# Patient Record
Sex: MALE | Race: WHITE
[De-identification: names, ages, dates, MRNs, and addresses within clinical notes are randomized per-mention and may not be internally consistent; named-entity substitution may affect disease eponyms.]

---

## 2017-04-04 ENCOUNTER — HOSPITAL ENCOUNTER (OUTPATIENT)
Dept: HOSPITAL 75 - RAD | Age: 32
End: 2017-04-04
Attending: FAMILY MEDICINE
Payer: COMMERCIAL

## 2017-04-04 DIAGNOSIS — R09.89: ICD-10-CM

## 2017-04-04 DIAGNOSIS — R10.11: Primary | ICD-10-CM

## 2017-04-04 LAB
ALBUMIN SERPL-MCNC: 4.2 G/DL (ref 3.2–4.5)
ALT SERPL-CCNC: 13 U/L (ref 0–55)
ANION GAP SERPL CALC-SCNC: 7 MMOL/L (ref 5–14)
AST SERPL-CCNC: 17 U/L (ref 5–34)
BASOPHILS # BLD AUTO: 0 10^3/UL (ref 0–0.1)
BASOPHILS NFR BLD AUTO: 0 % (ref 0–10)
BILIRUB SERPL-MCNC: 0.9 MG/DL (ref 0.1–1)
BUN SERPL-MCNC: 10 MG/DL (ref 7–18)
BUN/CREAT SERPL: 11
CALCIUM SERPL-MCNC: 8.8 MG/DL (ref 8.5–10.1)
CHLORIDE SERPL-SCNC: 102 MMOL/L (ref 98–107)
CO2 SERPL-SCNC: 27 MMOL/L (ref 21–32)
CREAT SERPL-MCNC: 0.89 MG/DL (ref 0.6–1.3)
EOSINOPHIL # BLD AUTO: 0.1 10^3/UL (ref 0–0.3)
EOSINOPHIL NFR BLD AUTO: 1 % (ref 0–10)
ERYTHROCYTE [DISTWIDTH] IN BLOOD BY AUTOMATED COUNT: 13.1 % (ref 10–14.5)
GFR SERPLBLD BASED ON 1.73 SQ M-ARVRAT: > 60 ML/MIN
GLUCOSE SERPL-MCNC: 88 MG/DL (ref 70–105)
LIPASE SERPL-CCNC: 11 U/L (ref 8–78)
LYMPHOCYTES # BLD AUTO: 1 X 10^3 (ref 1–4)
LYMPHOCYTES NFR BLD AUTO: 12 % (ref 12–44)
MCH RBC QN AUTO: 30 PG (ref 25–34)
MCHC RBC AUTO-ENTMCNC: 35 G/DL (ref 32–36)
MCV RBC AUTO: 86 FL (ref 80–99)
MONOCYTES # BLD AUTO: 0.6 X 10^3 (ref 0–1)
MONOCYTES NFR BLD AUTO: 7 % (ref 0–12)
NEUTROPHILS # BLD AUTO: 6.6 X 10^3 (ref 1.8–7.8)
NEUTROPHILS NFR BLD AUTO: 80 % (ref 42–75)
PLATELET # BLD: 271 10^3/UL (ref 130–400)
PMV BLD AUTO: 9.7 FL (ref 7.4–10.4)
POTASSIUM SERPL-SCNC: 4.2 MMOL/L (ref 3.6–5)
PROT SERPL-MCNC: 6.9 G/DL (ref 6.4–8.2)
RBC # BLD AUTO: 5.31 10^6/UL (ref 4.35–5.85)
SODIUM SERPL-SCNC: 136 MMOL/L (ref 135–145)
WBC # BLD AUTO: 8.2 10^3/UL (ref 4.3–11)

## 2017-04-04 PROCEDURE — 83690 ASSAY OF LIPASE: CPT

## 2017-04-04 PROCEDURE — 80053 COMPREHEN METABOLIC PANEL: CPT

## 2017-04-04 PROCEDURE — 74177 CT ABD & PELVIS W/CONTRAST: CPT

## 2017-04-04 PROCEDURE — 36415 COLL VENOUS BLD VENIPUNCTURE: CPT

## 2017-04-04 PROCEDURE — 85025 COMPLETE CBC W/AUTO DIFF WBC: CPT

## 2017-04-04 NOTE — DIAGNOSTIC IMAGING REPORT
PROCEDURE: CT abdomen and pelvis with contrast.



TECHNIQUE: Multiple contiguous axial images were obtained

through the abdomen and pelvis after administration of

intravenous contrast.



INDICATION: Upper abdominal pain, nausea.



COMPARISON: 07/08/2011.



FINDINGS: The lung bases are clear. The liver, gallbladder, and

bile ducts appear normal. The spleen, adrenals, and pancreas

appear normal. The unobstructed kidneys are well perfused,

nonfocal and nonacute. The appendix is surgically absent. There

is no diverticulitis. There is no evidence for bowel, biliary,

or urinary tract obstruction. There is no focal inflammatory

process. No aneurysm, adenopathy, or mass. There is no ascites,

pneumatosis, or free air. The osseous structures and lung bases

are nonacute.



IMPRESSION: No inflammatory process, obstructive features, fluid

collection, or acute-appearing abdominopelvic abnormalities.



Dictated by:



Dictated on workstation # [id number]

## 2019-06-10 ENCOUNTER — HOSPITAL ENCOUNTER (OUTPATIENT)
Dept: HOSPITAL 75 - LAB | Age: 34
LOS: 90 days | Discharge: HOME | End: 2019-09-08
Attending: OBSTETRICS & GYNECOLOGY
Payer: COMMERCIAL

## 2019-06-10 DIAGNOSIS — N46.9: Primary | ICD-10-CM

## 2019-06-10 LAB
SPECIMEN VOL SMN: 5 ML (ref 1.5–5)
SPERM MOTILE NFR SMN: 60 %

## 2019-06-10 PROCEDURE — 89320 SEMEN ANAL VOL/COUNT/MOT: CPT

## 2019-06-11 LAB — SPERM ABNORM NFR SMN: 31 %

## 2019-09-12 ENCOUNTER — HOSPITAL ENCOUNTER (OUTPATIENT)
Dept: HOSPITAL 75 - RAD | Age: 34
End: 2019-09-12
Attending: FAMILY MEDICINE
Payer: COMMERCIAL

## 2019-09-12 DIAGNOSIS — R06.00: Primary | ICD-10-CM

## 2019-09-12 DIAGNOSIS — R09.89: ICD-10-CM

## 2019-09-12 PROCEDURE — 71045 X-RAY EXAM CHEST 1 VIEW: CPT

## 2019-09-12 NOTE — DIAGNOSTIC IMAGING REPORT
INDICATION: COARSE CRACKLES DYSPNEA



COMPARISON: Expiratory view from same day.



FINDINGS: Single frontal view of the chest demonstrates normal

heart size and pulmonary vascularity. The lungs are well aerated

and clear. No large pleural effusion or pneumothorax is seen. The

visualized osseous structures show no acute abnormalities.



IMPRESSION: 

1. No acute cardiopulmonary process.  



Dictated by: 



  Dictated on workstation # [id number]

## 2019-09-12 NOTE — DIAGNOSTIC IMAGING REPORT
INDICATION: COARSE CRACKLES DYSPNEA



COMPARISON: Inspiratory view from same day.



FINDINGS: Single frontal view of the chest demonstrates normal

heart size and pulmonary vascularity. There is crowding of the

central hilar structures consistent with expiratory technique.

The lungs are well aerated and clear. No large pleural effusion

or pneumothorax is seen. The visualized osseous structures show

no acute abnormalities.



IMPRESSION: 

1. No acute cardiopulmonary process.  



Dictated by: 



  Dictated on workstation # [id number]

## 2019-12-13 ENCOUNTER — HOSPITAL ENCOUNTER (OUTPATIENT)
Dept: HOSPITAL 75 - RAD | Age: 34
End: 2019-12-13
Attending: FAMILY MEDICINE
Payer: COMMERCIAL

## 2019-12-13 DIAGNOSIS — M54.5: Primary | ICD-10-CM

## 2019-12-13 DIAGNOSIS — M53.3: ICD-10-CM

## 2019-12-13 PROCEDURE — 72100 X-RAY EXAM L-S SPINE 2/3 VWS: CPT

## 2019-12-13 PROCEDURE — 72202 X-RAY EXAM SI JOINTS 3/> VWS: CPT

## 2019-12-13 NOTE — DIAGNOSTIC IMAGING REPORT
INDICATION: Lower back and sacral pain.



COMPARISON: None.



FINDINGS: Three views of the sacrum and bilateral SI joints were

obtained and show no fractures, dislocations, or other acute bony

abnormalities. Joint spaces are well maintained throughout. The

soft tissues appear unremarkable. No radiopaque foreign bodies

are identified.



IMPRESSION: Unremarkable radiographic exam of the sacrum and SI

joints.



Dictated by: 



  Dictated on workstation # [id number]

## 2019-12-13 NOTE — DIAGNOSTIC IMAGING REPORT
INDICATION: Back pain.



COMPARISON: None



FINDINGS: Frontal and lateral views of the lumbar spine were

obtained. Alignment and vertebral heights are maintained. There

is no fracture or destructive process. Limited views of the

abdomen demonstrate nonobstructive bowel gas pattern. 



IMPRESSION: 

1. No acute fracture or dislocation of the lumbar spine.



Dictated by: 



  Dictated on workstation # [id number]

## 2021-04-19 ENCOUNTER — HOSPITAL ENCOUNTER (OUTPATIENT)
Dept: HOSPITAL 75 - RAD | Age: 36
End: 2021-04-19
Attending: FAMILY MEDICINE
Payer: COMMERCIAL

## 2021-04-19 DIAGNOSIS — M41.34: Primary | ICD-10-CM

## 2021-04-19 PROCEDURE — 72040 X-RAY EXAM NECK SPINE 2-3 VW: CPT

## 2021-04-19 PROCEDURE — 72072 X-RAY EXAM THORAC SPINE 3VWS: CPT

## 2021-04-19 NOTE — DIAGNOSTIC IMAGING REPORT
INDICATION: Neck pain



EXAM: Cervical spine



FINDINGS: AP and lateral views of the cervical spine show normal

vertebral body height and alignment. Intervertebral disc spaces

are well-maintained. The odontoid is intact. There is no fracture

seen.



IMPRESSION: Unremarkable cervical spine. 



Dictated by: 



  Dictated on workstation # [id number]

## 2021-04-19 NOTE — DIAGNOSTIC IMAGING REPORT
INDICATION: 

Back pain.



FINDINGS:

AP and lateral views of the thoracic spine show a slight

scoliotic curvature of the lower thoracic spine, convex to the

right. The vertebral body height and alignment appear normal. The

disc spaces are well-maintained.



IMPRESSION: 

Minimal scoliosis. No acute abnormality is seen.



Dictated by: 



  Dictated on workstation # [id number]

## 2021-06-21 ENCOUNTER — HOSPITAL ENCOUNTER (OUTPATIENT)
Dept: HOSPITAL 75 - RAD | Age: 36
End: 2021-06-21
Attending: SURGERY
Payer: COMMERCIAL

## 2021-06-21 DIAGNOSIS — R10.31: Primary | ICD-10-CM

## 2021-06-21 PROCEDURE — 76870 US EXAM SCROTUM: CPT

## 2021-06-21 NOTE — DIAGNOSTIC IMAGING REPORT
EXAMINATION: US Scrotum w/ Duplex



TECHNIQUE: Multiple realtime gray images were obtained of the

scrotum in various projections bilaterally. Color Doppler images

were also obtained.



HISTORY: Rain go groin pain.



COMPARISON: None available.



FINDINGS: 



The right testis has a homogeneous echogenic appearance without

intratesticular mass or hyperemia, and measures 4.2 x 2.4 x 3.5

cm. The right epididymis is normal. No extratesticular mass. No

hydrocele or varicocele.



The left testis has a homogeneous echogenic appearance without

intratesticular mass or hyperemia, and measures 5.2 x 2.7 x 3.2

cm. The left epididymis is normal. No extratesticular mass. No

hydrocele or varicocele.



Color and pulsed Doppler imaging demonstrates symmetric, flow

with normal arterial waveforms obtained from each testis.



No findings to suggest a right inguinal hernia.



IMPRESSION:



1. Unremarkable scrotal ultrasound. 



Dictated by: 



  Dictated on workstation # [id number]

## 2022-05-23 ENCOUNTER — HOSPITAL ENCOUNTER (OUTPATIENT)
Dept: HOSPITAL 75 - RAD | Age: 37
End: 2022-05-23
Attending: FAMILY MEDICINE
Payer: COMMERCIAL

## 2022-05-23 DIAGNOSIS — R10.31: ICD-10-CM

## 2022-05-23 DIAGNOSIS — R10.30: ICD-10-CM

## 2022-05-23 DIAGNOSIS — R10.11: Primary | ICD-10-CM

## 2022-05-23 PROCEDURE — 74177 CT ABD & PELVIS W/CONTRAST: CPT

## 2022-05-23 NOTE — DIAGNOSTIC IMAGING REPORT
EXAMINATION: CT abdomen and pelvis with intravenous contrast.



TECHNIQUE: Multiple contiguous axial images were obtained through

the abdomen and pelvis after the uneventful administration of

intravenous contrast. All CT scans use one or more of the

following dose optimizing techniques: automated exposure control,

MA and/or KvP adjustment based on patient size and exam type or

iterative reconstruction. 



HISTORY: Abdominal pain



COMPARISON: 04/04/2017



FINDINGS: 



Limited views of the lower thorax are unremarkable.



The liver is normal without focal lesion. There is no biliary

ductal dilation. Gallbladder is normal.  Pancreas is normal. 

Spleen is normal. Adrenal glands are normal.



The kidneys are normal. There is no hydronephrosis. Urinary

bladder is normal.



Bowel is normal in caliber without obstruction or inflammation.

No free fluid or air. No abdominal or pelvic lymphadenopathy.

Aorta is normal in caliber without aneurysm.



There are no suspicious osseus lesions.



IMPRESSION:



1. No acute abnormality in the abdomen or pelvis.



Dictated by: 



  Dictated on workstation # [id number]